# Patient Record
Sex: FEMALE | Race: OTHER | NOT HISPANIC OR LATINO
[De-identification: names, ages, dates, MRNs, and addresses within clinical notes are randomized per-mention and may not be internally consistent; named-entity substitution may affect disease eponyms.]

---

## 2023-03-28 PROBLEM — Z00.00 ENCOUNTER FOR PREVENTIVE HEALTH EXAMINATION: Status: ACTIVE | Noted: 2023-03-28

## 2023-03-31 ENCOUNTER — APPOINTMENT (OUTPATIENT)
Dept: GYNECOLOGIC ONCOLOGY | Facility: CLINIC | Age: 73
End: 2023-03-31
Payer: MEDICARE

## 2023-03-31 VITALS
HEART RATE: 70 BPM | TEMPERATURE: 98.7 F | HEIGHT: 59 IN | SYSTOLIC BLOOD PRESSURE: 150 MMHG | OXYGEN SATURATION: 98 % | BODY MASS INDEX: 31.25 KG/M2 | DIASTOLIC BLOOD PRESSURE: 76 MMHG | WEIGHT: 155 LBS

## 2023-03-31 DIAGNOSIS — Z86.39 PERSONAL HISTORY OF OTHER ENDOCRINE, NUTRITIONAL AND METABOLIC DISEASE: ICD-10-CM

## 2023-03-31 DIAGNOSIS — Z86.018 PERSONAL HISTORY OF OTHER BENIGN NEOPLASM: ICD-10-CM

## 2023-03-31 DIAGNOSIS — R93.89 ABNORMAL FINDINGS ON DIAGNOSTIC IMAGING OF OTHER SPECIFIED BODY STRUCTURES: ICD-10-CM

## 2023-03-31 DIAGNOSIS — N85.01 BENIGN ENDOMETRIAL HYPERPLASIA: ICD-10-CM

## 2023-03-31 DIAGNOSIS — Z86.79 PERSONAL HISTORY OF OTHER DISEASES OF THE CIRCULATORY SYSTEM: ICD-10-CM

## 2023-03-31 PROCEDURE — 99205 OFFICE O/P NEW HI 60 MIN: CPT

## 2023-03-31 RX ORDER — LOSARTAN POTASSIUM 100 MG/1
100 TABLET, FILM COATED ORAL
Refills: 0 | Status: ACTIVE | COMMUNITY

## 2023-03-31 RX ORDER — AMLODIPINE BESYLATE 10 MG/1
10 TABLET ORAL
Refills: 0 | Status: ACTIVE | COMMUNITY

## 2023-03-31 RX ORDER — GLIMEPIRIDE 2 MG/1
2 TABLET ORAL
Refills: 0 | Status: ACTIVE | COMMUNITY

## 2023-03-31 RX ORDER — CARVEDILOL 3.12 MG/1
TABLET, FILM COATED ORAL
Refills: 0 | Status: ACTIVE | COMMUNITY

## 2023-03-31 RX ORDER — SIMVASTATIN 10 MG/1
10 TABLET, FILM COATED ORAL
Refills: 0 | Status: ACTIVE | COMMUNITY

## 2023-03-31 RX ORDER — INSULIN GLARGINE 100 [IU]/ML
100 INJECTION, SOLUTION SUBCUTANEOUS
Refills: 0 | Status: ACTIVE | COMMUNITY

## 2023-03-31 RX ORDER — DULAGLUTIDE 4.5 MG/.5ML
INJECTION, SOLUTION SUBCUTANEOUS
Refills: 0 | Status: ACTIVE | COMMUNITY

## 2023-04-01 NOTE — DISCUSSION/SUMMARY
[Reviewed Clinical Lab Test(s)] : Results of clinical tests were reviewed. [Reviewed Radiology Report(s)] : Radiology reports were reviewed. [Discuss Alternatives/Risks/Benefits w/Patient] : All alternatives, risks, and benefits were discussed with the patient/family and all questions were answered.  Patient expressed good understanding and appreciates the importance of follow up as recommended. [Visit Time ___ Minutes] : [unfilled] minutes [Face to Face Time___ Minutes] : with [unfilled] minutes in face to face consultation. [FreeTextEntry1] : 71yo w/ hx of CAH/EIN diagnoased 5yrs ago without any treatment. Now with thickened EE and continued PMB. failed in office embx. For definitive surgical mgmt.\par -more than 50% of visit spent face to face with patient reviewing records and interpreting imaging/path/lab results, counseling and coordinating care\par -I reviewed diagnosis of CAH/EIN and in her case the very high risk that she has diagnosis of cancer now given it has been 5yrs since diagnosis of CAH/EIN and recent imaging reveals thickened EE and endometrial mass. I did recommend embx today in office to confirm diagnosis of cancer however patient unable to tolerate procedure and cervical stenosis encountered. I explained that I do not see the need for delaying definitive surgery with d&c/hsc and recommend we proceed directly to total hysterectomy BSO with sentinel lymph node biopsies.\par  -Different surgical approaches discussed including minimally invasive and open approaches. I recommend advanced laparoscopic robotic assisted total hysterectomy and bilateral salpingo-oophorectomy with sentinel lymph node dissection. I explained to patient that since there is a 40% chance of finding concurrent endometrial cancer at time of final pathology, I follow The NCCN guidelines with regards to endometrial cancer staging and reviewed sentinel lymph node mapping and biopsies. If sentinel lymph node biopsy is unsuccessful, I explained that I would have pathologist perform a frozen section to determine if cancer is present. If cancer is present then full lymph node dissection would be performed. If no cancer found then I would not proceed with full lymph node dissection given the morbidity associated. all questions answered and patient expressed understanding\par -Surgical booking: Robo/tlh/bso/slnbx/possible full LND/possible laparotomy all indicated procedures\par -ERAS, pre-op, PSTs, covid testing\par -the above diagnosis, nature of treatment, procedure, options, benefits, and risks were reviewed. I explained in detail the possible complications including but not limited to bleeding, transfusion, transfusion complications, infection, injury to internal organs such as injury to gastrointestinal or urinary tract, possible fistula formation, prolonged catherization, intestinal or urinary tract obstruction, vascular injury, wound complications, venous thrombosis, pulmonary embolus, sepsis, pain, chronic disability, and fatal complications, possible re-operation to correct complications, and possible abandoning or modifying the procedure due to inoperative findings was discussed. All questions were answered. Patient verbally indicated that she understood the nature of treatment, indications, options, benefits, and risks. Patient elected and consented to the procedure. Pt was informed that there was no guarantee for outcome or cure, and that additional therapy may be indicated depending on the operative findings. \par -f/u post-op. \par \par \par

## 2023-04-01 NOTE — LETTER BODY
[Dear  ___] : Dear  [unfilled], [FreeTextEntry2] : \par Thank you for referring CELSO GOVEA to [facility]necologic [facility]. I had the pleasure of meeting with Ms. CELSO GOVEA on 3/31/23. Please find my consultation note attached. Thank you for your trust and confidence in me and our practice, it means a great deal to us. Please feel free to contact me at anytime.\par Sincerely, \par \par Dr. Robin Novoa\par Office: [phone]\par Fax: [phone]\par

## 2023-04-01 NOTE — HISTORY OF PRESENT ILLNESS
[FreeTextEntry1] : 71yo P1  LMP at age 52yo referred for PMB and endometrial hyperplasia with atypia diagnosed in 2018. Pt was referred for hysterectomy at that time but due to family illness and the pandemic she has delayed care. She was seen by her GYN this month where a pap, sono and CT were performed. Pt states she continues to have irregular bleeding Q1-2x per month and is minimal. \par She denies n/v/fever/bleeding/bloating. Reports normal urination and BMs. \par \par PMHx: DM, HTN\par PSHx: open appendectomy in 1970\par OBGYNHx:  x1, denies hx of fibroids/cysts/abn paps/stis, however fibroids were reported on sono\par FamHx: pt's sister  from endometrial cancer, denies fam hx of breast ca, colon ca\par SocHx: denies\par \par mammogram: 10/28/22 BIR[facility] 4 - no further follow up\par colonoscopy: denies\par \par Labs:\par 2018\par Endometrial curettage: complex endometrial hyperplasia with atypia and focally increased glandular complexity, approaching the level of well-differentiated endometrial adenocarcinoma\par CA-125 = 8.2(3/22/23)\par \par Pap smear 3/22/23: negative for intraepithelial lesion or malignancy, HPV HR neg\par \par Imaging:\par \par Pelvic sono 3/22/23: \par Uterus: anteverted, 52mm x 67mm x 57mm, fibroid\par Endometrium: complex, endometrial thickness total 24.2mm\par Fibroids: 1. 13mm x12mm x13mm intramural right lateral\par 2. 12mm x 8mm x13mm, intramural anterior right lateral\par 3. 15mm x 11mm x 16mm, intramural posterior right lateral\par 4. 32mm x27mm x25mm, intramural left lateral\par Right ovary: not visualized\par Left Ovary: not visualized\par Impression: \par anteverted fibroid uterus, four fibroids largest measures 32mmx 25mm x27mm\par The endometrium appears complex measuring 24.2mm a mass is seen within the endometrium measuring 31 x 21 x22mm. \par Bilateral ovaries were not visualized transvaginally or tansabdominally\par \par \par CT A/P 3/24/23: \par Irregular uterine appearance with suggestion of a solid left uterine mass, questionably a fibroid and irregular endometrial appearance. Recommend MRI for further characterization, There is also irregular appearance of the gallbladder, with gallbladder wall calcifications and a cystic structure arising from the presumed gallbladder fundus. There are subcentimeter (short axis) pelvic sidewall lymph nodes, and subcentimeter inguinal lymph nodes\par \par \par \par

## 2023-04-21 ENCOUNTER — TRANSCRIPTION ENCOUNTER (OUTPATIENT)
Age: 73
End: 2023-04-21

## 2023-04-21 ENCOUNTER — APPOINTMENT (OUTPATIENT)
Dept: GYNECOLOGIC ONCOLOGY | Facility: HOSPITAL | Age: 73
End: 2023-04-21

## 2023-04-21 ENCOUNTER — RESULT REVIEW (OUTPATIENT)
Age: 73
End: 2023-04-21

## 2023-04-22 ENCOUNTER — RESULT REVIEW (OUTPATIENT)
Age: 73
End: 2023-04-22

## 2023-04-28 ENCOUNTER — APPOINTMENT (OUTPATIENT)
Dept: GYNECOLOGIC ONCOLOGY | Facility: CLINIC | Age: 73
End: 2023-04-28
Payer: MEDICARE

## 2023-04-28 VITALS — HEART RATE: 71 BPM | DIASTOLIC BLOOD PRESSURE: 64 MMHG | SYSTOLIC BLOOD PRESSURE: 140 MMHG | OXYGEN SATURATION: 98 %

## 2023-04-28 PROCEDURE — 99024 POSTOP FOLLOW-UP VISIT: CPT

## 2023-04-29 ENCOUNTER — NON-APPOINTMENT (OUTPATIENT)
Age: 73
End: 2023-04-29

## 2023-05-01 NOTE — DISCUSSION/SUMMARY
[Face to Face Time___ Minutes] : with [unfilled] minutes in face to face consultation. [Visit Time ___ Minutes] : [unfilled] minutes [FreeTextEntry1] : 71yo P1 with stage 1A grade 2 endometriod carcinoma now seen 1 week postop from robo tlh/bso/snlbx/olinda/cholecystectomy. Pt healing very well and reports no complaints. Briefly reviewed pathology results and will discuss in more detail including next steps for treatment and follow up with MD at next visit\par -more than 50% of visit spent face to face with patient counseling and coordinating care \par -rtc 1-2 weeks for post op evaluation and complete pathology review\par -will refer to radiation oncology for consultation\par -pain/fever/bleeding precautions given \par \par D/W Dr Novoa

## 2023-05-01 NOTE — HISTORY OF PRESENT ILLNESS
[FreeTextEntry1] : 71yo P1 referred for PMB and endometrial hyperplasia with atypia diagnosed in 2018 now s/p \par robo TLH, BSO, L pelvic sentinel lymph node biopsy, R sentinel lymph node dissection, CARLOS, cholecystectomy for intraop finding of cystic and solid structure extending from the gallbladder with \par adhesions to the liver and omentum\par \par Dx: stage IA, FIGO grade 2 endometrioid carcinoma, MI 18%, PAX8, ER/VA+ partially P16, P53 wild type, MLH-1/PMS2 - loss of nuclear expression\par Tx: robo tlh/bso/slnbx 4/21/23\par Adjtx:\par \par Since procedure, patient reports doing well. She reports minimal pain that no longer requires pain meds and has resumed most normal activity with good tolerance.  She reports normal appetite. She denies fever/bleeding/bloating. Reports normal urination and BM’s. She is continuing her PO antibiotics for cholecystitis and has 1-2days remaining. \par \par Path results reviewed.\par Pathology: \par Specimen\par Procedure: Total hysterectomy and bilateral salpingo-oophorectomy\par Hysterectomy Type: Laparoscopic, robotic-assisted\par Specimen Integrity: Intact\par Tumor\par Tumor Site: Endometrium\par Tumor Size: 4.5 Centimeters (cm)\par Histologic Type: Endometrioid carcinoma, NOS - Mismatch repair-deficient\par endometrioid carcinoma\par Histologic Grade: FIGO grade 2\par Two-Tier Grading System: Low grade (encompassing FIGO 1 and 2)\par Myometrial Invasion: Present\par Depth of Myometrial Invasion: 3 mm\par Myometrial Thickness: 17 mm\par Percentage of Myometrial Invasion 18%\par Adenomyosis: Not identified\par Uterine Serosa Involvement: Not identified\par Lower Uterine Segment Involvement: Present, superficial (non-myoinvasive)\par Cervical Stromal Involvement: Not identified\par Peritoneal / Ascitic Fluid: Atypical  ()\par Lymphovascular Invasion (LVI): Not identified\par Margins\par Margin Status: All margins negative for invasive carcinoma\par Regional Lymph Nodes\par Regional Lymph Node Status: All regional lymph nodes negative for tumor cells\par Lymph Nodes Examined\par Total Number of Pelvic Nodes Examined: 5\par Number of Pelvic [place] Nodes Examined: 1\par Pathologic Stage Classification (pTNM, AJCC 8th Edition)\par pT Category: pT1a\par pN Category: pN0\par FIGO Stage\par FIGO Stage: IA\par Best Tumor Blocks for Future Studies\par Tumor Block(s): A8, A7, A15\par Normal Block(s): No endometrial tissue block without cancer.\par \par ------------------------------------------------------------------------------------------\par \par \par Specimen(s)\par \par A. UTERUS, CERVIX, BILATERAL FALLOPIAN TUBES AND OVARIES\par B. GALLBLADDER\par C. LEFT PELVIC SENTINEL LYMPH NODE\par D. RIGHT PELVIC LYMPH NODE\par \par \par FINAL PATHOLOGIC DIAGNOSIS\par A. UTERUS, CERVIX, BILATERAL FALLOPIAN TUBES AND OVARIES:\par - Endometrial carcinoma, endometrioid type FIGO rdgrdrrdarddrderd:rd3rd occupying the entire anterior and\par posterior endometrial cavity, measuring up to 4.5 cm with superficial myometrial invasion.\par - Tumor invades lower uterine segment and endocervical glands.\par - Bilateral fallopian tubes negative for carcinoma.\par - Bilateral ovaries; negative for carcinoma\par - Large vessels showing medial calcifications.\par NOTE:  Detached tumor fragments identified adjacent to cut sections of parametrial tissue\par and favored to be artifactual /carry over.\par \par B. 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 LEFT PELVIC SENTINEL LYMPH NODE:\par -  One reactive lymph node; negative for metastatic carcinoma (panCK immunohistochemical\par stain is examined).\par D. RIGHT PELVIC LYMPH NODE:\par -  Four reactive lymph nodes; negative for metastatic carcinoma (panCK and Ck7\par immunohistochemical stains are examined).\par \par \par Diagnosis Comment\par Immunohistochemical stains performed to revealed tumor cells to be positive for PAX8, ER,\par VA and partially p16. P53 showed wild type staining.\par MMR testing performed on block A8:\par ANTIBODY/PROBE    MARKER                RESULTS\par MLH-1             DNA Mismatch Repair Protein        Loss of nuclear expression\par MSH-2             DNA Mismatch Repair Protein        Intact nuclear staining\par MSH-6             DNA Mismatch Repair Protein        Intact nuclear staining\par PMS2            DNA Mismatch Repair Protein        Loss of nuclear expression\par Background non-neoplastic tissue/internal control with intact nuclear expression.\par Molecular testing for MLH1 promotor hypermethylation has been requested and results will\par be issued as an addendum.\par \par \par Intraoperative Diagnosis\par A. Uterus, cervix, bilateral fallopian tubes and ovaries, frozen section diagnosis:\par Intraoperative Diagnosis      \par - Well-differentiated endometrial adenocarcinoma in background of atypical endometrial\par hyperplasia\par By Dr. Denson to Dr. Novoa 3:54 PM 04/21/23\par B. Gallbladder, gross diagnosis:\par - Cholelithiasis and mural necrosis\par \par \par

## 2023-05-08 ENCOUNTER — TRANSCRIPTION ENCOUNTER (OUTPATIENT)
Age: 73
End: 2023-05-08

## 2023-05-08 ENCOUNTER — APPOINTMENT (OUTPATIENT)
Dept: RADIATION ONCOLOGY | Facility: CLINIC | Age: 73
End: 2023-05-08
Payer: MEDICARE

## 2023-05-08 VITALS
OXYGEN SATURATION: 98 % | SYSTOLIC BLOOD PRESSURE: 151 MMHG | DIASTOLIC BLOOD PRESSURE: 78 MMHG | HEART RATE: 67 BPM | RESPIRATION RATE: 18 BRPM

## 2023-05-08 PROCEDURE — 99205 OFFICE O/P NEW HI 60 MIN: CPT | Mod: 25

## 2023-05-08 NOTE — HISTORY OF PRESENT ILLNESS
[FreeTextEntry1] : Ms. Underwood 72 year old female recently diagnosed with FIGO Stage I A endometrial adenocarcinoma. \par \par Ms. Underwood presented with post menopausal bleeding and endometrial hyperplasia with atypia diagnosed in 2018. Her  suffered from two TIAs at the time and the patient was not able to pursue treatment. She continued to have irregular bleeding Q1-2x per month and noted that it was minimal. \par \par Endometrial curettage (7/25/2018): demonstrated complex endometrial hyperplasia with atypia and focally increased glandular complexity, approaching the level of well-differentiated endometrial adenocarcinoma. \par \par Pap smear 3/22/23: negative for intraepithelial lesion or malignancy, HPV HR neg\par Transvaginal pelvic sono (3/22/23): anteverted fibroid uterus, four fibroids the largest measured 32 mm x 25 mm x 27 mm. The endometrium appeared complex measuring 24.2 mm a mass was seen within the endometrium measuring 31 x 21 x 22 mm. Bilateral ovaries were not visualized transvaginally or transabdominally. \par \par CT A/P 3/24/23: demonstrated an irregular uterine appearance with solid left uterine mass, questionable fibroid and irregular endometrial appearance. Irregular appearance of the gallbladder, with gallbladder wall calcifications and a cystic structure arising from the presumed gallbladder fundus. \par \par Total hysterectomy and bilateral salpingo-oophorectomy and cholecystectomy was performed by Dr. Braulio Oro and Dr. Hernandez (4/27/23).\par A. UTERUS, CERVIX, BILATERAL FALLOPIAN TUBES AND OVARIES:\par - Endometrial carcinoma, endometrioid type FIGO rdgrdrrdarddrderd:rd3rd occupying the entire anterior and\par posterior endometrial cavity, measuring up to 4.5 cm with superficial myometrial invasion.\par - Tumor invaded the lower uterine segment and endocervical glands.\par - Bilateral fallopian tubes negative for carcinoma.\par - Bilateral ovaries; negative for carcinoma\par - Large vessels showing medial calcifications.\par \par 0/5 lymph nodes positive for metastatic carcinoma. \par \par GALLBLADDER:\par - Xanthogranulomatous cholecystitis showing  dystrophic calcifications at the wall and\par polypoid cholesterol clefts in the lumen, cholelithiasis.\par \par Ms. Underwood tolerated the procedure well without significant side effects.  She denies any vaginal bleeding or discharge, she denies diarrhea or constipation. She stays physically active by walking.\par \par Family History:  Sister passed away from metastatic uterine cancer, Maternal Grandmother also had uterine cancer.

## 2023-05-08 NOTE — LETTER CLOSING
[Consult Closing:] : Thank you for allowing me to participate in the care of this patient.  If you have any questions, please do not hesitate to contact me. [Sincerely yours,] : Sincerely yours, [FreeTextEntry3] : Mackenzie Lundberg MD\par

## 2023-05-08 NOTE — DISEASE MANAGEMENT
[Pathological] : TNM Stage: p [FreeTextEntry4] : Endometrial Adenocarcinoma [TTNM] : 1a [NTNM] : 0 [MTNM] : 0 [I] : I

## 2023-05-08 NOTE — VITALS
[Maximal Pain Intensity: 0/10] : 0/10 [Least Pain Intensity: 0/10] : 0/10 [100: Normal, no complaints, no evidence of disease.] : 100: Normal, no complaints, no evidence of disease. [Date: ____________] : Patient's last distress assessment performed on [unfilled].

## 2023-05-08 NOTE — PHYSICAL EXAM
[Normal] : no joint swelling, no clubbing or cyanosis of the fingernails and muscle strength and tone were normal [de-identified] : Vaginal cuff almost completely healed

## 2023-05-10 ENCOUNTER — APPOINTMENT (OUTPATIENT)
Dept: GYNECOLOGIC ONCOLOGY | Facility: CLINIC | Age: 73
End: 2023-05-10

## 2023-05-12 ENCOUNTER — APPOINTMENT (OUTPATIENT)
Dept: GYNECOLOGIC ONCOLOGY | Facility: CLINIC | Age: 73
End: 2023-05-12
Payer: MEDICARE

## 2023-05-12 VITALS — SYSTOLIC BLOOD PRESSURE: 147 MMHG | HEART RATE: 67 BPM | DIASTOLIC BLOOD PRESSURE: 70 MMHG | OXYGEN SATURATION: 98 %

## 2023-05-12 PROCEDURE — 99213 OFFICE O/P EST LOW 20 MIN: CPT | Mod: 24

## 2023-05-12 PROCEDURE — 99024 POSTOP FOLLOW-UP VISIT: CPT

## 2023-05-12 NOTE — DISCUSSION/SUMMARY
[Reviewed Clinical Lab Test(s)] : Results of clinical tests were reviewed. [Discuss Alternatives/Risks/Benefits w/Patient] : All alternatives, risks, and benefits were discussed with the patient/family and all questions were answered.  Patient expressed good understanding and appreciates the importance of follow up as recommended. [Visit Time ___ Minutes] : [unfilled] minutes [Face to Face Time___ Minutes] : with [unfilled] minutes in face to face consultation. [FreeTextEntry1] : 73yo w/ stage IA grade 2 endometrial cancer, MSI-H, for adjuvant therapy with VBT given age and grade of tumor.\par -more than 50% of visit spent face to face with patient counseling and coordinating care\par -I reviewed diagnosis, stage of disease, and risk factors on final path that support adjuvant therapy with VBT. reviewed vaginal recurrence risk and VBT will lower risk to <5%. Reviewed low risk to VBT and overall benefit. Pt already seen by radonjabari and agrees with plan. all questions answered and patient expressed understanding\par -cuff check today and patient OK to start VBT in 3wks from today. pt also to continue pelvic rest, no heavy lifting, no intercourse for another 3wks\par -rtc 2months\par -pt will need referral to genetics given MSI-H status of tumor\par -pain/fever/bleeding precautions given\par

## 2023-05-12 NOTE — HISTORY OF PRESENT ILLNESS
[FreeTextEntry1] : 71yo P1 here for cuff check\par \par Dx: stage IA, FIGO grade 2 endometrioid carcinoma, MI 18%, PAX8, ER/SD+ partially P16, P53 wild type, MLH-1/PMS2 - loss of nuclear expression\par Tx: robo tlh/bso/slnbx 4/21/23, robotic cholecystectomy at same procedure for gangrene gallbladder\par Adjtx: VBT, pending start date\par \par Since last visit patient reports doing well. She was seen by radon (Dr Lundberg) who suggests high dose rate brachytherapy to the vaginal cuff. She denies pain/fever/bleeding/bloating. She has normal activity tolerance and normal appetite. Reports normal urination and BMs. Patient feels ready to return to work.\par

## 2023-07-06 NOTE — HISTORY OF PRESENT ILLNESS
[FreeTextEntry1] : 71yo P1 here for follow up\par \par Dx: stage IA, FIGO grade 2 endometrioid carcinoma, MI 18%, PAX8, ER/CA+ partially P16, P53 wild type, MLH-1/PMS2 - loss of nuclear expression\par Tx: robo tlh/bso/slnbx 4/21/23, robotic cholecystectomy at same procedure for gangrene gallbladder\par Adjtx: VBT (6/5/23 - 6/12/23) Dr Lundberg\par \par Since last visit patient reports doing well. She denies pain/fever/bleeding/bloating. She has normal activity tolerance and normal appetite. Reports normal urination and BMs. \par

## 2023-07-07 ENCOUNTER — APPOINTMENT (OUTPATIENT)
Dept: GYNECOLOGIC ONCOLOGY | Facility: CLINIC | Age: 73
End: 2023-07-07
Payer: MEDICARE

## 2023-07-28 NOTE — HISTORY OF PRESENT ILLNESS
[FreeTextEntry1] : Ms. Underwood 72 year old female recently diagnosed with FIGO Stage I A endometrial adenocarcinoma. \par \par Ms. Underwood presented with post menopausal bleeding and endometrial hyperplasia with atypia diagnosed in 2018. Her  suffered from two TIAs at the time and the patient was not able to pursue treatment. She continued to have irregular bleeding Q1-2x per month and noted that it was minimal. \par \par Endometrial curettage (7/25/2018): demonstrated complex endometrial hyperplasia with atypia and focally increased glandular complexity, approaching the level of well-differentiated endometrial adenocarcinoma. \par \par Pap smear 3/22/23: negative for intraepithelial lesion or malignancy, HPV HR neg\par Transvaginal pelvic sono (3/22/23): anteverted fibroid uterus, four fibroids the largest measured 32 mm x 25 mm x 27 mm. The endometrium appeared complex measuring 24.2 mm a mass was seen within the endometrium measuring 31 x 21 x 22 mm. Bilateral ovaries were not visualized transvaginally or transabdominally. \par \par CT A/P 3/24/23: demonstrated an irregular uterine appearance with solid left uterine mass, questionable fibroid and irregular endometrial appearance. Irregular appearance of the gallbladder, with gallbladder wall calcifications and a cystic structure arising from the presumed gallbladder fundus. \par \par Total hysterectomy and bilateral salpingo-oophorectomy and cholecystectomy was performed by Dr. Braulio Oro and Dr. Hernandez (4/27/23).\par A. UTERUS, CERVIX, BILATERAL FALLOPIAN TUBES AND OVARIES:\par - Endometrial carcinoma, endometrioid type FIGO rdgrdrrdarddrderd:rd3rd occupying the entire anterior and\par posterior endometrial cavity, measuring up to 4.5 cm with superficial myometrial invasion.\par - Tumor invaded the lower uterine segment and endocervical glands.\par - Bilateral fallopian tubes negative for carcinoma.\par - Bilateral ovaries; negative for carcinoma\par - Large vessels showing medial calcifications.\par \par 0/5 lymph nodes positive for metastatic carcinoma. \par \par GALLBLADDER:\par - Xanthogranulomatous cholecystitis showing dystrophic calcifications at the wall and\par polypoid cholesterol clefts in the lumen, cholelithiasis.\par \par Ms. Underwood completed vaginal brachytherapy 3 fractions to a total dose of 2100 cGy on 6/12/23. \par \par 8/3/23 Ms. Kj presents today for post-treatment evaluation.

## 2023-08-01 NOTE — PHYSICAL EXAM
[Chaperone Present] : A chaperone was present in the examining room during all aspects of the physical examination [de-identified] : General: NAD, well nourished, well developed  Neck: normal, no LAD Respiratory: normal effort Heart: normal Abdomen: soft, nt/nd, no masses Lymphatic: no groin LAD Pelvis: Ext genitalia no lesions Vagina: no lesions, no blood, **atrophy Cervix: no lesions Uterus: Adnexa: Bimanual exam: Rectal exam: smooth septum, parametria free Neuro: AAOx3 Extremities: no edema    [Fully active, able to carry on all pre-disease performance without restriction] : Status 0 - Fully active, able to carry on all pre-disease performance without restriction

## 2023-08-03 ENCOUNTER — APPOINTMENT (OUTPATIENT)
Dept: RADIATION ONCOLOGY | Facility: CLINIC | Age: 73
End: 2023-08-03
Payer: MEDICARE

## 2023-08-04 ENCOUNTER — APPOINTMENT (OUTPATIENT)
Dept: GYNECOLOGIC ONCOLOGY | Facility: CLINIC | Age: 73
End: 2023-08-04
Payer: MEDICARE

## 2023-08-04 VITALS — DIASTOLIC BLOOD PRESSURE: 74 MMHG | OXYGEN SATURATION: 98 % | HEART RATE: 70 BPM | SYSTOLIC BLOOD PRESSURE: 159 MMHG

## 2023-08-04 PROCEDURE — 99213 OFFICE O/P EST LOW 20 MIN: CPT

## 2023-08-04 NOTE — HISTORY OF PRESENT ILLNESS
[FreeTextEntry1] : 71yo P1 here for follow up  Dx: stage IA, FIGO grade 2 endometrioid carcinoma, MI 18%, PAX8, ER/MN+ partially P16, P53 wild type, MLH-1/PMS2 - loss of nuclear expression  Tx: robo tlh/bso/slnbx 4/21/23, robotic cholecystectomy at same procedure for gangrene gallbladder  Adjtx: VBT (6/5/23 - 6/12/23) Dr Lundberg  Since last visit patient reports doing well. She denies pain/fever/bleeding/bloating. She has normal activity tolerance and normal appetite. Reports normal urination and BMs. She is up to date with her PCP. Overall feels very good and without complaints. Patient started using vaginal dilators without issues and she has resumed sexual activity without issues.

## 2023-08-04 NOTE — DISCUSSION/SUMMARY
[Reviewed Clinical Lab Test(s)] : Results of clinical tests were reviewed. [Discuss Alternatives/Risks/Benefits w/Patient] : All alternatives, risks, and benefits were discussed with the patient/family and all questions were answered.  Patient expressed good understanding and appreciates the importance of follow up as recommended. [Visit Time ___ Minutes] : [unfilled] minutes [Face to Face Time___ Minutes] : with [unfilled] minutes in face to face consultation. [FreeTextEntry1] : 73yo w/ stage IA grade 2 endometrial cancer, MSI-H, s/p adjuvant VBT. Doing very well. JAVAN on exam today. -more than 50% of visit spent face to face with patient counseling and coordinating care -I reviewed diagnosis, stage of disease, and treatment course. I reviewed low risk of recurrence given adjuvant therapy, reviewed surveillance plan, reviewed si/sxs of recurrence. all questions answered and patient expressed understanding -continue health maintenance with PCP -rtc 6 months -pain/fever/bleeding precautions given

## 2023-09-06 ENCOUNTER — APPOINTMENT (OUTPATIENT)
Dept: RADIATION ONCOLOGY | Facility: CLINIC | Age: 73
End: 2023-09-06
Payer: MEDICARE

## 2023-09-06 VITALS
SYSTOLIC BLOOD PRESSURE: 157 MMHG | OXYGEN SATURATION: 99 % | HEART RATE: 66 BPM | DIASTOLIC BLOOD PRESSURE: 74 MMHG | RESPIRATION RATE: 18 BRPM

## 2023-09-06 PROCEDURE — 99024 POSTOP FOLLOW-UP VISIT: CPT

## 2023-09-06 NOTE — REVIEW OF SYSTEMS
[Negative] : Genitourinary [Constipation: Grade 0] : Constipation: Grade 0 [Diarrhea: Grade 0] : Diarrhea: Grade 0 [Fatigue: Grade 0] : Fatigue: Grade 0 [Hematuria: Grade 0] : Hematuria: Grade 0 [Urinary Incontinence: Grade 0] : Urinary Incontinence: Grade 0  [Urinary Retention: Grade 0] : Urinary Retention: Grade 0 [Urinary Tract Pain: Grade 0] : Urinary Tract Pain: Grade 0 [Urinary Urgency: Grade 0] : Urinary Urgency: Grade 0 [Urinary Frequency: Grade 0] : Urinary Frequency: Grade 0

## 2023-09-07 NOTE — HISTORY OF PRESENT ILLNESS
[FreeTextEntry1] : Ms. Underwood 72 year old female recently diagnosed with FIGO Stage I A endometrial adenocarcinoma.  Ms. Underwood presented with post menopausal bleeding and endometrial hyperplasia with atypia diagnosed in 2018. Her  suffered from two TIAs at the time and the patient was not able to pursue treatment. She continued to have irregular bleeding Q1-2x per month and noted that it was minimal.  Endometrial curettage (7/25/2018): demonstrated complex endometrial hyperplasia with atypia and focally increased glandular complexity, approaching the level of well-differentiated endometrial adenocarcinoma.  Pap smear 3/22/23: negative for intraepithelial lesion or malignancy, HPV HR neg  Transvaginal pelvic sono (3/22/23): anteverted fibroid uterus, four fibroids the largest measured 32 mm x 25 mm x 27 mm. The endometrium appeared complex measuring 24.2 mm a mass was seen within the endometrium measuring 31 x 21 x 22 mm. Bilateral ovaries were not visualized transvaginally or transabdominally.  CT A/P 3/24/23: demonstrated an irregular uterine appearance with solid left uterine mass, questionable fibroid and irregular endometrial appearance. Irregular appearance of the gallbladder, with gallbladder wall calcifications and a cystic structure arising from the presumed gallbladder fundus.  Total hysterectomy and bilateral salpingo-oophorectomy and cholecystectomy was performed by Dr. Braulio Oro and Dr. Hernandez (4/27/23). Pathology revealed:  A. UTERUS, CERVIX, BILATERAL FALLOPIAN TUBES AND OVARIES: - Endometrial carcinoma, endometrioid type FIGO [id number] occupying the entire anterior and posterior endometrial cavity, measuring up to 4.5 cm with superficial myometrial invasion. - Tumor invaded the lower uterine segment and endocervical glands. - Bilateral fallopian tubes negative for carcinoma. - Bilateral ovaries; negative for carcinoma - Large vessels showing medial calcifications. 0/5 lymph nodes positive for metastatic carcinoma. 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  Ms. Underwood was treated with adjuvant vaginal brachytherapy 3 fractions to a total dose of 2100 cGy completed on 6/12/23. Ms. Underwood presents today for routine follow up. She states that she feels well overall. She denies any gastrointestinal or genitourinary complaints at this time and denies any bleeding or pain. She continues to follow with Dr. Novoa who she saw in July and will see again in February. She continues to use her dilator as recommended and states she is sexually active without any issues including discomfort.

## 2024-02-02 ENCOUNTER — APPOINTMENT (OUTPATIENT)
Dept: GYNECOLOGIC ONCOLOGY | Facility: CLINIC | Age: 74
End: 2024-02-02
Payer: SELF-PAY

## 2024-02-02 VITALS — SYSTOLIC BLOOD PRESSURE: 160 MMHG | DIASTOLIC BLOOD PRESSURE: 72 MMHG | OXYGEN SATURATION: 98 % | HEART RATE: 61 BPM

## 2024-02-02 DIAGNOSIS — B37.31 ACUTE CANDIDIASIS OF VULVA AND VAGINA: ICD-10-CM

## 2024-02-02 DIAGNOSIS — C54.1 MALIGNANT NEOPLASM OF ENDOMETRIUM: ICD-10-CM

## 2024-02-02 PROCEDURE — 99213 OFFICE O/P EST LOW 20 MIN: CPT

## 2024-02-02 RX ORDER — FLUCONAZOLE 150 MG/1
150 TABLET ORAL
Qty: 3 | Refills: 0 | Status: ACTIVE | COMMUNITY
Start: 2024-02-02 | End: 1900-01-01

## 2024-02-02 RX ORDER — NYSTATIN AND TRIAMCINOLONE ACETONIDE 100000; 1 [USP'U]/G; MG/G
100000-0.1 OINTMENT TOPICAL TWICE DAILY
Qty: 1 | Refills: 1 | Status: ACTIVE | COMMUNITY
Start: 2024-02-02 | End: 1900-01-01

## 2024-02-02 NOTE — DISCUSSION/SUMMARY
[Reviewed Clinical Lab Test(s)] : Results of clinical tests were reviewed. [Discuss Alternatives/Risks/Benefits w/Patient] : All alternatives, risks, and benefits were discussed with the patient/family and all questions were answered.  Patient expressed good understanding and appreciates the importance of follow up as recommended. [Visit Time ___ Minutes] : [unfilled] minutes [Face to Face Time___ Minutes] : with [unfilled] minutes in face to face consultation. [FreeTextEntry1] : 74yo w/ stage IA grade 2 endometrial cancer, MSI-H, s/p adjuvant VBT. Doing very well. JAVAN on exam today. -more than 50% of visit spent face to face with patient counseling and coordinating care -I reviewed diagnosis, stage of disease, and treatment course. I reviewed low risk of recurrence given adjuvant therapy, reviewed surveillance plan, reviewed si/sxs of recurrence. all questions answered and patient expressed understanding -vaginal/vulvar candidasis - will start mycolog cream and oral difulcan. Rx sent to pharmacy -continue health maintenance with PCP -rtc 6 months -pain/fever/bleeding precautions given

## 2024-02-02 NOTE — HISTORY OF PRESENT ILLNESS
[FreeTextEntry1] : 74yo P1 here for follow up  Dx: stage IA, FIGO grade 2 endometrioid carcinoma, MI 18%, PAX8, ER/MN+ partially P16, P53 wild type, MLH-1/PMS2 - loss of nuclear expression  Tx: robo tlh/bso/slnbx 4/21/23, robotic cholecystectomy at same procedure for gangrene gallbladder  Adjtx: VBT (6/5/23 - 6/12/23) Dr Lundberg  Since last visit patient reports doing well. She denies pain/fever/bleeding/bloating. She has normal activity tolerance and normal appetite. Reports normal urination and BMs. She is up to date with her PCP and states that her BPs have been uncontrolled and she is working on modifying medications. she also reports new vaginal yeast infection that has only improved  minimally with OTC antifungal creams since she changed her DM medications.

## 2024-08-16 NOTE — PHYSICAL EXAM
[Chaperone Present] : A chaperone was present in the examining room during all aspects of the physical examination [Fully active, able to carry on all pre-disease performance without restriction] : Status 0 - Fully active, able to carry on all pre-disease performance without restriction Statement Selected

## 2024-08-23 ENCOUNTER — APPOINTMENT (OUTPATIENT)
Dept: GYNECOLOGIC ONCOLOGY | Facility: CLINIC | Age: 74
End: 2024-08-23
Payer: MEDICARE

## 2024-08-23 VITALS — HEART RATE: 90 BPM | DIASTOLIC BLOOD PRESSURE: 56 MMHG | OXYGEN SATURATION: 97 % | SYSTOLIC BLOOD PRESSURE: 160 MMHG

## 2024-08-23 DIAGNOSIS — C54.1 MALIGNANT NEOPLASM OF ENDOMETRIUM: ICD-10-CM

## 2024-08-23 PROCEDURE — G2211 COMPLEX E/M VISIT ADD ON: CPT

## 2024-08-23 PROCEDURE — 99215 OFFICE O/P EST HI 40 MIN: CPT

## 2024-08-23 PROCEDURE — 99459 PELVIC EXAMINATION: CPT

## 2024-08-23 NOTE — HISTORY OF PRESENT ILLNESS
[FreeTextEntry1] : 72yo P1 here for follow up  Dx: stage IA, FIGO grade 2 endometrioid carcinoma, MI 18%, PAX8, ER/AK+ partially P16, P53 wild type, MLH-1/PMS2 - loss of nuclear expression  Tx: robo tlh/bso/slnbx 4/21/23, robotic cholecystectomy at same procedure for gangrene gallbladder  Adjtx: VBT (6/5/23 - 6/12/23) Dr Lundberg  Since last visit patient reports doing well. She denies pain/fever/bleeding/bloating. She has normal activity tolerance and normal appetite. Reports normal urination and BMs. she is sexually active without issues. She is up to date with her PCP and BPs are better controlled. she is planning to schedule mammo and colonoscopy this year.

## 2024-08-23 NOTE — HISTORY OF PRESENT ILLNESS
[FreeTextEntry1] : 74yo P1 here for follow up  Dx: stage IA, FIGO grade 2 endometrioid carcinoma, MI 18%, PAX8, ER/MD+ partially P16, P53 wild type, MLH-1/PMS2 - loss of nuclear expression  Tx: robo tlh/bso/slnbx 4/21/23, robotic cholecystectomy at same procedure for gangrene gallbladder  Adjtx: VBT (6/5/23 - 6/12/23) Dr Lundberg  Since last visit patient reports doing well. She denies pain/fever/bleeding/bloating. She has normal activity tolerance and normal appetite. Reports normal urination and BMs. she is sexually active without issues. She is up to date with her PCP and BPs are better controlled. she is planning to schedule mammo and colonoscopy this year.

## 2024-08-23 NOTE — DISCUSSION/SUMMARY
[Reviewed Clinical Lab Test(s)] : Results of clinical tests were reviewed. [Discuss Alternatives/Risks/Benefits w/Patient] : All alternatives, risks, and benefits were discussed with the patient/family and all questions were answered.  Patient expressed good understanding and appreciates the importance of follow up as recommended. [Visit Time ___ Minutes] : [unfilled] minutes [Face to Face Time___ Minutes] : with [unfilled] minutes in face to face consultation. [Reviewed Radiology Report(s)] : Radiology reports were reviewed. [FreeTextEntry1] : 74yo w/ stage IA grade 2 endometrial cancer, MSI-H, s/p adjuvant VBT. Doing very well. JAVAN on exam today. DFI 1yr -more than 50% of visit spent face to face with patient counseling and coordinating care with high level complexity -I reviewed diagnosis, stage of disease, and treatment course. I reviewed low risk of recurrence given adjuvant therapy, reviewed surveillance plan, reviewed si/sxs of recurrence. all questions answered and patient expressed understanding -continue health maintenance with PCP -rtc 6 months, will plan yearly visits at 2yrs DFI -pain/fever/bleeding precautions given

## 2025-02-28 ENCOUNTER — APPOINTMENT (OUTPATIENT)
Dept: GYNECOLOGIC ONCOLOGY | Facility: CLINIC | Age: 75
End: 2025-02-28
Payer: COMMERCIAL

## 2025-02-28 VITALS — HEART RATE: 78 BPM | OXYGEN SATURATION: 98 % | SYSTOLIC BLOOD PRESSURE: 164 MMHG | DIASTOLIC BLOOD PRESSURE: 63 MMHG

## 2025-02-28 DIAGNOSIS — Z00.00 ENCOUNTER FOR GENERAL ADULT MEDICAL EXAMINATION W/OUT ABNORMAL FINDINGS: ICD-10-CM

## 2025-02-28 DIAGNOSIS — C54.1 MALIGNANT NEOPLASM OF ENDOMETRIUM: ICD-10-CM

## 2025-02-28 PROCEDURE — 99459 PELVIC EXAMINATION: CPT

## 2025-02-28 PROCEDURE — 99213 OFFICE O/P EST LOW 20 MIN: CPT

## 2025-02-28 PROCEDURE — G2211 COMPLEX E/M VISIT ADD ON: CPT | Mod: NC

## 2025-03-07 ENCOUNTER — APPOINTMENT (OUTPATIENT)
Dept: COLORECTAL SURGERY | Facility: CLINIC | Age: 75
End: 2025-03-07

## 2025-09-05 ENCOUNTER — APPOINTMENT (OUTPATIENT)
Dept: GYNECOLOGIC ONCOLOGY | Facility: CLINIC | Age: 75
End: 2025-09-05